# Patient Record
Sex: FEMALE | Race: BLACK OR AFRICAN AMERICAN | URBAN - METROPOLITAN AREA
[De-identification: names, ages, dates, MRNs, and addresses within clinical notes are randomized per-mention and may not be internally consistent; named-entity substitution may affect disease eponyms.]

---

## 2021-02-04 ENCOUNTER — OFFICE VISIT (OUTPATIENT)
Dept: URBAN - METROPOLITAN AREA CLINIC 115 | Facility: CLINIC | Age: 41
End: 2021-02-04

## 2021-02-18 ENCOUNTER — WEB ENCOUNTER (OUTPATIENT)
Dept: URBAN - METROPOLITAN AREA CLINIC 115 | Facility: CLINIC | Age: 41
End: 2021-02-18

## 2021-02-18 ENCOUNTER — LAB OUTSIDE AN ENCOUNTER (OUTPATIENT)
Dept: URBAN - METROPOLITAN AREA CLINIC 115 | Facility: CLINIC | Age: 41
End: 2021-02-18

## 2021-02-18 ENCOUNTER — OFFICE VISIT (OUTPATIENT)
Dept: URBAN - METROPOLITAN AREA CLINIC 115 | Facility: CLINIC | Age: 41
End: 2021-02-18

## 2021-02-18 ENCOUNTER — OFFICE VISIT (OUTPATIENT)
Dept: URBAN - METROPOLITAN AREA CLINIC 115 | Facility: CLINIC | Age: 41
End: 2021-02-18
Payer: MEDICAID

## 2021-02-18 DIAGNOSIS — Z80.0 FAMILY HISTORY OF COLON CANCER: ICD-10-CM

## 2021-02-18 DIAGNOSIS — K64.9 HEMORRHOIDS: ICD-10-CM

## 2021-02-18 DIAGNOSIS — K21.9 GERD (GASTROESOPHAGEAL REFLUX DISEASE): ICD-10-CM

## 2021-02-18 DIAGNOSIS — K57.90 DIVERTICULOSIS: ICD-10-CM

## 2021-02-18 PROBLEM — 14760008 CONSTIPATION: Status: ACTIVE | Noted: 2021-02-18

## 2021-02-18 PROCEDURE — 99204 OFFICE O/P NEW MOD 45 MIN: CPT | Performed by: INTERNAL MEDICINE

## 2021-02-18 PROCEDURE — G9903 PT SCRN TBCO ID AS NON USER: HCPCS | Performed by: INTERNAL MEDICINE

## 2021-02-18 PROCEDURE — G8427 DOCREV CUR MEDS BY ELIG CLIN: HCPCS | Performed by: INTERNAL MEDICINE

## 2021-02-18 PROCEDURE — 1036F TOBACCO NON-USER: CPT | Performed by: INTERNAL MEDICINE

## 2021-02-18 PROCEDURE — G8420 CALC BMI NORM PARAMETERS: HCPCS | Performed by: INTERNAL MEDICINE

## 2021-02-18 PROCEDURE — 99244 OFF/OP CNSLTJ NEW/EST MOD 40: CPT | Performed by: INTERNAL MEDICINE

## 2021-02-18 RX ORDER — SODIUM, POTASSIUM,MAG SULFATES 17.5-3.13G
TAKE 325 ML SOLUTION, RECONSTITUTED, ORAL ORAL ONCE
Qty: 1 | Refills: 0 | OUTPATIENT
Start: 2021-02-18 | End: 2021-02-19

## 2021-02-18 RX ORDER — MESALAMINE 1.2 G/1
1 CAPSULE TABLET, DELAYED RELEASE ORAL ONCE A DAY
Qty: 90 | Refills: 2 | OUTPATIENT
Start: 2021-02-18 | End: 2021-11-15

## 2021-02-18 NOTE — HPI-TODAY'S VISIT:
02/18/2021 Patient is a 41 year old  female who presents on referral from Dr. Suh for evaluation of diverticulosis. She was diagnosed with diverticulosis last summer when she went to the ER in june and July. It was initially suspected to be a stomach virus, but diagnosed as diverticulosis afterwards with CT scan. No previous colonoscopy. Patient also has hemorrhoids which causes her some pain. She thinks they are both internal and external. Denies constipation, blood in the stool, and stomach pain. Her last flare up was about 2 months ago. She has constipation during flare ups, but BM are usually normal. Her grandfather passed away from colon cancer in his 70s. She also has occasional acid reflux, more present when she was pregnant. She also notes feeling a hard feeling in her abdomen from gas.

## 2021-02-19 ENCOUNTER — ERX REFILL RESPONSE (OUTPATIENT)
Dept: URBAN - METROPOLITAN AREA CLINIC 115 | Facility: CLINIC | Age: 41
End: 2021-02-19

## 2021-02-19 RX ORDER — SODIUM SULFATE, POTASSIUM SULFATE, MAGNESIUM SULFATE 17.5; 3.13; 1.6 G/ML; G/ML; G/ML
TAKE AS DIRECTED SOLUTION, CONCENTRATE ORAL
Qty: 354 MILLILITER | Refills: 0 | OUTPATIENT

## 2021-02-19 RX ORDER — SODIUM SULFATE, POTASSIUM SULFATE, MAGNESIUM SULFATE 17.5; 3.13; 1.6 G/ML; G/ML; G/ML
TAKE 325 ML SOLUTION, CONCENTRATE ORAL ONCE
Qty: 354 | Refills: 0 | OUTPATIENT

## 2021-03-12 ENCOUNTER — OFFICE VISIT (OUTPATIENT)
Dept: URBAN - METROPOLITAN AREA SURGERY CENTER 13 | Facility: SURGERY CENTER | Age: 41
End: 2021-03-12
Payer: MEDICAID

## 2021-03-12 DIAGNOSIS — D12.3 ADENOMA OF TRANSVERSE COLON: ICD-10-CM

## 2021-03-12 DIAGNOSIS — Z12.11 COLON CANCER SCREENING: ICD-10-CM

## 2021-03-12 DIAGNOSIS — K64.1 GRADE II HEMORRHOIDS: ICD-10-CM

## 2021-03-12 PROCEDURE — G8907 PT DOC NO EVENTS ON DISCHARG: HCPCS | Performed by: INTERNAL MEDICINE

## 2021-03-12 PROCEDURE — 46221 LIGATION OF HEMORRHOID(S): CPT | Performed by: INTERNAL MEDICINE

## 2021-03-12 PROCEDURE — 45385 COLONOSCOPY W/LESION REMOVAL: CPT | Performed by: INTERNAL MEDICINE

## 2022-08-24 ENCOUNTER — OFFICE VISIT (OUTPATIENT)
Dept: URBAN - METROPOLITAN AREA CLINIC 82 | Facility: CLINIC | Age: 42
End: 2022-08-24

## 2022-10-14 ENCOUNTER — DASHBOARD ENCOUNTERS (OUTPATIENT)
Age: 42
End: 2022-10-14

## 2022-10-14 ENCOUNTER — OFFICE VISIT (OUTPATIENT)
Dept: URBAN - METROPOLITAN AREA CLINIC 82 | Facility: CLINIC | Age: 42
End: 2022-10-14
Payer: MEDICAID

## 2022-10-14 VITALS
RESPIRATION RATE: 16 BRPM | DIASTOLIC BLOOD PRESSURE: 95 MMHG | HEIGHT: 65 IN | BODY MASS INDEX: 28.16 KG/M2 | TEMPERATURE: 98.3 F | WEIGHT: 169 LBS | HEART RATE: 83 BPM | SYSTOLIC BLOOD PRESSURE: 148 MMHG

## 2022-10-14 DIAGNOSIS — K64.8 BLEEDING INTERNAL HEMORRHOIDS: ICD-10-CM

## 2022-10-14 DIAGNOSIS — Z80.0 FAMILY HISTORY OF COLON CANCER: ICD-10-CM

## 2022-10-14 DIAGNOSIS — K21.9 GERD (GASTROESOPHAGEAL REFLUX DISEASE): ICD-10-CM

## 2022-10-14 DIAGNOSIS — K57.30 ACQUIRED DIVERTICULOSIS OF COLON: ICD-10-CM

## 2022-10-14 PROBLEM — 312824007 FAMILY HISTORY OF CANCER OF COLON (SITUATION): Status: ACTIVE | Noted: 2021-02-18

## 2022-10-14 PROBLEM — 398050005 DIVERTICULAR DISEASE OF COLON: Status: ACTIVE | Noted: 2021-02-18

## 2022-10-14 PROBLEM — 70153002 HEMORRHOIDS: Status: ACTIVE | Noted: 2021-02-18

## 2022-10-14 PROBLEM — 235595009 GASTROESOPHAGEAL REFLUX DISEASE: Status: ACTIVE | Noted: 2021-02-18

## 2022-10-14 PROCEDURE — 46600 DIAGNOSTIC ANOSCOPY SPX: CPT | Performed by: INTERNAL MEDICINE

## 2022-10-14 PROCEDURE — 99214 OFFICE O/P EST MOD 30 MIN: CPT | Performed by: INTERNAL MEDICINE

## 2022-10-14 PROCEDURE — G9903 PT SCRN TBCO ID AS NON USER: HCPCS | Performed by: INTERNAL MEDICINE

## 2022-10-14 PROCEDURE — 1036F TOBACCO NON-USER: CPT | Performed by: INTERNAL MEDICINE

## 2022-10-14 PROCEDURE — G8420 CALC BMI NORM PARAMETERS: HCPCS | Performed by: INTERNAL MEDICINE

## 2022-10-14 PROCEDURE — G8427 DOCREV CUR MEDS BY ELIG CLIN: HCPCS | Performed by: INTERNAL MEDICINE

## 2022-10-14 PROCEDURE — 46611 ANOSCOPY: CPT | Performed by: INTERNAL MEDICINE

## 2022-10-14 RX ORDER — HYDROCORTISONE 2.5% 25 MG/G
1 APPLICATION CREAM TOPICAL TWICE A DAY
Qty: 60 GRAMS | Refills: 1 | OUTPATIENT
Start: 2022-10-14 | End: 2022-11-10

## 2022-10-14 RX ORDER — LEVOCETIRIZINE DIHYDROCHLORIDE 5 MG/1
1 TABLET IN THE EVENING TABLET, FILM COATED ORAL ONCE A DAY
Status: ON HOLD | COMMUNITY

## 2022-10-14 RX ORDER — TRIAMCINOLONE ACETONIDE 1 MG/G
1 APPLICATION CREAM TOPICAL
Status: ON HOLD | COMMUNITY

## 2022-10-14 RX ORDER — SODIUM SULFATE, POTASSIUM SULFATE, MAGNESIUM SULFATE 17.5; 3.13; 1.6 G/ML; G/ML; G/ML
TAKE AS DIRECTED SOLUTION, CONCENTRATE ORAL
Qty: 354 MILLILITER | Refills: 0 | COMMUNITY

## 2022-10-14 NOTE — HPI-TODAY'S VISIT:
02/18/2021 Patient is a 41 year old  female who presents on referral from Dr. Suh for evaluation of diverticulosis. She was diagnosed with diverticulosis last summer when she went to the ER in june and July. It was initially suspected to be a stomach virus, but diagnosed as diverticulosis afterwards with CT scan. No previous colonoscopy. Patient also has hemorrhoids which causes her some pain. She thinks they are both internal and external. Denies constipation, blood in the stool, and stomach pain. Her last flare up was about 2 months ago. She has constipation during flare ups, but BM are usually normal. Her grandfather passed away from colon cancer in his 70s. She also has occasional acid reflux, more present when she was pregnant. She also notes feeling a hard feeling in her abdomen from gas.  10/14/2022 Patient presents for hemorrhoids. Patient states that her hemorrhoids are always swollen and that she thinks they are outside and inside. She denies constipation and states that she sometimes has rectal bleeding due to her wiping hard.

## 2024-06-27 ENCOUNTER — LAB OUTSIDE AN ENCOUNTER (OUTPATIENT)
Dept: URBAN - METROPOLITAN AREA CLINIC 115 | Facility: CLINIC | Age: 44
End: 2024-06-27

## 2024-06-27 ENCOUNTER — OFFICE VISIT (OUTPATIENT)
Dept: URBAN - METROPOLITAN AREA CLINIC 115 | Facility: CLINIC | Age: 44
End: 2024-06-27
Payer: MEDICAID

## 2024-06-27 VITALS
WEIGHT: 174.6 LBS | DIASTOLIC BLOOD PRESSURE: 83 MMHG | HEART RATE: 81 BPM | SYSTOLIC BLOOD PRESSURE: 123 MMHG | BODY MASS INDEX: 29.09 KG/M2 | TEMPERATURE: 97.8 F | HEIGHT: 65 IN

## 2024-06-27 DIAGNOSIS — K80.20 GALLSTONES: ICD-10-CM

## 2024-06-27 DIAGNOSIS — K21.9 GASTROESOPHAGEAL REFLUX DISEASE, UNSPECIFIED WHETHER ESOPHAGITIS PRESENT: ICD-10-CM

## 2024-06-27 DIAGNOSIS — K64.8 OTHER HEMORRHOIDS: ICD-10-CM

## 2024-06-27 DIAGNOSIS — K59.09 OTHER CONSTIPATION: ICD-10-CM

## 2024-06-27 DIAGNOSIS — K76.9 HEPATIC LESION: ICD-10-CM

## 2024-06-27 PROBLEM — 235919008: Status: ACTIVE | Noted: 2024-06-27

## 2024-06-27 PROBLEM — 235595009: Status: ACTIVE | Noted: 2024-06-27

## 2024-06-27 PROBLEM — 70153002: Status: ACTIVE | Noted: 2024-06-27

## 2024-06-27 PROBLEM — 14760008: Status: ACTIVE | Noted: 2024-06-27

## 2024-06-27 PROBLEM — 300331000: Status: ACTIVE | Noted: 2024-06-27

## 2024-06-27 PROCEDURE — 99214 OFFICE O/P EST MOD 30 MIN: CPT

## 2024-06-27 RX ORDER — AMLODIPINE BESYLATE 5 MG/1
1 TABLET TABLET ORAL ONCE A DAY
Status: ACTIVE | COMMUNITY

## 2024-06-27 RX ORDER — LEVOCETIRIZINE DIHYDROCHLORIDE 5 MG/1
1 TABLET IN THE EVENING TABLET, FILM COATED ORAL ONCE A DAY
Status: ON HOLD | COMMUNITY

## 2024-06-27 RX ORDER — TRIAMCINOLONE ACETONIDE 1 MG/G
1 APPLICATION CREAM TOPICAL
Status: ON HOLD | COMMUNITY

## 2024-06-27 RX ORDER — PANTOPRAZOLE SODIUM 40 MG/1
1 TABLET BEFORE BREAKFAST TABLET, DELAYED RELEASE ORAL ONCE A DAY
Qty: 30 TABLET | Refills: 1 | OUTPATIENT
Start: 2024-06-27

## 2024-06-27 RX ORDER — SODIUM SULFATE, POTASSIUM SULFATE, MAGNESIUM SULFATE 17.5; 3.13; 1.6 G/ML; G/ML; G/ML
TAKE AS DIRECTED SOLUTION, CONCENTRATE ORAL
Qty: 354 MILLILITER | Refills: 0 | COMMUNITY

## 2024-06-27 NOTE — PHYSICAL EXAM GASTROINTESTINAL
Abdomen , soft, mild generalized tenderness, neg Solomon's sign, nondistended , no guarding or rigidity , no masses palpable

## 2024-06-27 NOTE — HPI-TODAY'S VISIT:
45 y/o F with PMH of HTN presents with c/o gallstones and liver lesion. Went to [place] on 6/4/24 for L sided abdominal pain. Had CT that showed a 24 x 21 mm peripherally enhancing low attenuation lesion (poss hemangioma) on R lobe (rec 3 phase CT  for further characterization, gallstones. AST 28, ALT 21, alkP 87, tot bili 0.6, plts 171. FIB4 1.57 (further investigation needed). Discharged with famotidine 20mg and naproxen 500mg. States the famotidine has helped a little. Today she denies the L sided abdominal pain. States she has postprandial abdominal pain that leads to a BM; having BMs at least 3x a day, sometimes diarrhea. Reports hemorrhoid sx still, bleeding with wiping and swelling/pains. Also reports heartburn that she does not take anything for it; just drinks something with sean. Notes she does get nausea more recently but unsure if it is d/t her menstrual period. BMs are usually type 3-4, sometimes in between typ 6-7; incomplete evacuation. Denies abdominal pain, vomting, constipation, dysphagia, odynophagia, unintentional weight loss, change in appetite, early satiety. Occ EtOH (glass of wine socially). Denies tobacco/marijuana use.  Last colonoscopy: 2021 with Dr. Cruz showed hemorrhoids, one 10mm TA in transverse colon, hemorrhoids s/p banding x3, diverticula in sigmoid/descending colon; repeat in 5Y.

## 2024-07-23 ENCOUNTER — TELEPHONE ENCOUNTER (OUTPATIENT)
Dept: URBAN - METROPOLITAN AREA CLINIC 115 | Facility: CLINIC | Age: 44
End: 2024-07-23

## 2024-07-23 ENCOUNTER — TELEPHONE ENCOUNTER (OUTPATIENT)
Dept: URBAN - METROPOLITAN AREA CLINIC 54 | Facility: CLINIC | Age: 44
End: 2024-07-23

## 2024-07-23 ENCOUNTER — LAB OUTSIDE AN ENCOUNTER (OUTPATIENT)
Dept: URBAN - METROPOLITAN AREA CLINIC 115 | Facility: CLINIC | Age: 44
End: 2024-07-23

## 2024-07-25 PROBLEM — 31258000: Status: ACTIVE | Noted: 2024-07-25

## 2024-07-31 LAB — CA 19-9: <3

## 2024-08-12 ENCOUNTER — LAB OUTSIDE AN ENCOUNTER (OUTPATIENT)
Dept: URBAN - METROPOLITAN AREA CLINIC 115 | Facility: CLINIC | Age: 44
End: 2024-08-12

## 2024-08-12 ENCOUNTER — OFFICE VISIT (OUTPATIENT)
Dept: URBAN - METROPOLITAN AREA CLINIC 115 | Facility: CLINIC | Age: 44
End: 2024-08-12
Payer: MEDICAID

## 2024-08-12 VITALS
SYSTOLIC BLOOD PRESSURE: 127 MMHG | HEIGHT: 65 IN | BODY MASS INDEX: 28.86 KG/M2 | DIASTOLIC BLOOD PRESSURE: 81 MMHG | TEMPERATURE: 96.5 F | RESPIRATION RATE: 15 BRPM | WEIGHT: 173.2 LBS | HEART RATE: 99 BPM

## 2024-08-12 DIAGNOSIS — K76.0 FATTY LIVER DISEASE, NONALCOHOLIC: ICD-10-CM

## 2024-08-12 DIAGNOSIS — K21.9 GASTRO-ESOPHAGEAL REFLUX DISEASE WITHOUT ESOPHAGITIS: ICD-10-CM

## 2024-08-12 DIAGNOSIS — K86.2 PANCREATIC CYST: ICD-10-CM

## 2024-08-12 PROBLEM — 1231824009: Status: ACTIVE | Noted: 2024-08-12

## 2024-08-12 PROCEDURE — 99214 OFFICE O/P EST MOD 30 MIN: CPT

## 2024-08-12 RX ORDER — LEVOCETIRIZINE DIHYDROCHLORIDE 5 MG/1
1 TABLET IN THE EVENING TABLET, FILM COATED ORAL ONCE A DAY
Status: ON HOLD | COMMUNITY

## 2024-08-12 RX ORDER — AMLODIPINE BESYLATE 5 MG/1
1 TABLET TABLET ORAL ONCE A DAY
Status: ACTIVE | COMMUNITY

## 2024-08-12 RX ORDER — SODIUM SULFATE, POTASSIUM SULFATE, MAGNESIUM SULFATE 17.5; 3.13; 1.6 G/ML; G/ML; G/ML
TAKE AS DIRECTED SOLUTION, CONCENTRATE ORAL
Qty: 354 MILLILITER | Refills: 0 | COMMUNITY

## 2024-08-12 RX ORDER — PANTOPRAZOLE SODIUM 40 MG/1
1 TABLET BEFORE BREAKFAST TABLET, DELAYED RELEASE ORAL ONCE A DAY
Qty: 30 TABLET | Refills: 1
Start: 2024-06-27

## 2024-08-12 RX ORDER — TRIAMCINOLONE ACETONIDE 1 MG/G
1 APPLICATION CREAM TOPICAL
Status: ON HOLD | COMMUNITY

## 2024-08-12 RX ORDER — PANTOPRAZOLE SODIUM 40 MG/1
1 TABLET BEFORE BREAKFAST TABLET, DELAYED RELEASE ORAL ONCE A DAY
Qty: 30 TABLET | Refills: 1 | Status: ACTIVE | COMMUNITY
Start: 2024-06-27

## 2024-08-12 NOTE — PHYSICAL EXAM GASTROINTESTINAL
Abdomen , soft, epigastric mild tenderness, nondistended , no guarding or rigidity , no masses palpable

## 2024-08-12 NOTE — HPI-TODAY'S VISIT:
43 y/o F with PMH of HTN presents with c/o gallstones and liver lesion. 6/27/24: Went to [place] on 6/4/24 for L sided abdominal pain. Had CT that showed a 24 x 21 mm peripherally enhancing low attenuation lesion (poss hemangioma) on R lobe (rec 3 phase CT  for further characterization, gallstones. AST 28, ALT 21, alkP 87, tot bili 0.6, plts 171. FIB4 1.57 (further investigation needed). Discharged with famotidine 20mg and naproxen 500mg. States the famotidine has helped a little. Today she denies the L sided abdominal pain. States she has postprandial abdominal pain that leads to a BM; having BMs at least 3x a day, sometimes diarrhea. Reports hemorrhoid sx still, bleeding with wiping and swelling/pains. Also reports heartburn that she does not take anything for it; just drinks something with sean. Notes she does get nausea more recently but unsure if it is d/t her menstrual period. BMs are usually type 3-4, sometimes in between typ 6-7; incomplete evacuation. Denies abdominal pain, vomting, constipation, dysphagia, odynophagia, unintentional weight loss, change in appetite, early satiety. Occ EtOH (glass of wine socially). Denies tobacco/marijuana use.  Last colonoscopy: 2021 with Dr. Cruz showed hemorrhoids, one 10mm TA in transverse colon, hemorrhoids s/p banding x3, diverticula in sigmoid/descending colon; repeat in 5Y. 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 Subsequent CA 19-9 was <3. Denies abdominal pain, n/v, EtOH use. States she has been taking pantoprazole 40mg which has been helping. Heartburn, postprandial abdominal pain and diarrhea are infrequent. Notes that when she eats leafy greens she gets trigger. States she has been avoiding greasy and spicy foods.

## 2024-08-23 ENCOUNTER — TELEPHONE ENCOUNTER (OUTPATIENT)
Dept: URBAN - METROPOLITAN AREA CLINIC 115 | Facility: CLINIC | Age: 44
End: 2024-08-23

## 2024-08-29 ENCOUNTER — OFFICE VISIT (OUTPATIENT)
Dept: URBAN - METROPOLITAN AREA CLINIC 115 | Facility: CLINIC | Age: 44
End: 2024-08-29

## 2024-10-14 ENCOUNTER — OFFICE VISIT (OUTPATIENT)
Dept: URBAN - METROPOLITAN AREA CLINIC 115 | Facility: CLINIC | Age: 44
End: 2024-10-14

## 2024-11-04 ENCOUNTER — ERX REFILL RESPONSE (OUTPATIENT)
Dept: URBAN - METROPOLITAN AREA CLINIC 115 | Facility: CLINIC | Age: 44
End: 2024-11-04

## 2024-11-04 RX ORDER — PANTOPRAZOLE SODIUM 40 MG/1
1 TABLET BEFORE BREAKFAST TABLET, DELAYED RELEASE ORAL ONCE A DAY
Qty: 30 TABLET | Refills: 1 | OUTPATIENT

## 2024-11-04 RX ORDER — PANTOPRAZOLE SODIUM 40 MG/1
TAKE 1 TABLET BY MOUTH EVERY DAY BEFORE BREAKFAST TABLET, DELAYED RELEASE ORAL
Qty: 30 TABLET | Refills: 1 | OUTPATIENT

## 2024-11-26 ENCOUNTER — OFFICE VISIT (OUTPATIENT)
Dept: URBAN - METROPOLITAN AREA MEDICAL CENTER 24 | Facility: MEDICAL CENTER | Age: 44
End: 2024-11-26

## 2025-02-20 ENCOUNTER — OFFICE VISIT (OUTPATIENT)
Dept: URBAN - METROPOLITAN AREA CLINIC 115 | Facility: CLINIC | Age: 45
End: 2025-02-20

## 2025-02-26 ENCOUNTER — OFFICE VISIT (OUTPATIENT)
Dept: URBAN - METROPOLITAN AREA CLINIC 115 | Facility: CLINIC | Age: 45
End: 2025-02-26

## 2025-03-06 ENCOUNTER — OFFICE VISIT (OUTPATIENT)
Dept: URBAN - METROPOLITAN AREA CLINIC 115 | Facility: CLINIC | Age: 45
End: 2025-03-06
Payer: COMMERCIAL

## 2025-03-06 ENCOUNTER — LAB OUTSIDE AN ENCOUNTER (OUTPATIENT)
Dept: URBAN - METROPOLITAN AREA CLINIC 115 | Facility: CLINIC | Age: 45
End: 2025-03-06

## 2025-03-06 VITALS
SYSTOLIC BLOOD PRESSURE: 127 MMHG | WEIGHT: 176 LBS | HEART RATE: 101 BPM | DIASTOLIC BLOOD PRESSURE: 84 MMHG | TEMPERATURE: 98.5 F | HEIGHT: 65 IN | BODY MASS INDEX: 29.32 KG/M2

## 2025-03-06 DIAGNOSIS — R10.84 GENERALIZED ABDOMINAL PAIN: ICD-10-CM

## 2025-03-06 DIAGNOSIS — K64.9 HEMORRHOIDS: ICD-10-CM

## 2025-03-06 DIAGNOSIS — Z80.0 FAMILY HISTORY OF COLON CANCER: ICD-10-CM

## 2025-03-06 DIAGNOSIS — K86.2 PANCREATIC CYST: ICD-10-CM

## 2025-03-06 DIAGNOSIS — K57.90 DIVERTICULOSIS: ICD-10-CM

## 2025-03-06 DIAGNOSIS — K21.9 GERD (GASTROESOPHAGEAL REFLUX DISEASE): ICD-10-CM

## 2025-03-06 PROCEDURE — 99214 OFFICE O/P EST MOD 30 MIN: CPT | Performed by: INTERNAL MEDICINE

## 2025-03-06 RX ORDER — PANTOPRAZOLE SODIUM 40 MG/1
TAKE 1 TABLET BY MOUTH EVERY DAY BEFORE BREAKFAST TABLET, DELAYED RELEASE ORAL
Qty: 30 TABLET | Refills: 1 | Status: ACTIVE | COMMUNITY

## 2025-03-06 RX ORDER — AMLODIPINE BESYLATE 5 MG/1
1 TABLET TABLET ORAL ONCE A DAY
Status: ACTIVE | COMMUNITY

## 2025-03-06 RX ORDER — TRIAMCINOLONE ACETONIDE 1 MG/G
1 APPLICATION CREAM TOPICAL
Status: ON HOLD | COMMUNITY

## 2025-03-06 RX ORDER — LEVOCETIRIZINE DIHYDROCHLORIDE 5 MG/1
1 TABLET IN THE EVENING TABLET, FILM COATED ORAL ONCE A DAY
Status: ON HOLD | COMMUNITY

## 2025-03-06 NOTE — HPI-TODAY'S VISIT:
02/18/2021 Patient is a 41 year old  female who presents on referral from Dr. Suh for evaluation of diverticulosis. She was diagnosed with diverticulosis last summer when she went to the ER in june and July. It was initially suspected to be a stomach virus, but diagnosed as diverticulosis afterwards with CT scan. No previous colonoscopy. Patient also has hemorrhoids which causes her some pain. She thinks they are both internal and external. Denies constipation, blood in the stool, and stomach pain. Her last flare up was about 2 months ago. She has constipation during flare ups, but BM are usually normal. Her grandfather passed away from colon cancer in his 70s. She also has occasional acid reflux, more present when she was pregnant. She also notes feeling a hard feeling in her abdomen from gas.  10/14/2022 Patient presents for hemorrhoids. Patient states that her hemorrhoids are always swollen and that she thinks they are outside and inside. She denies constipation and states that she sometimes has rectal bleeding due to her wiping hard.  3/6/2025 Patient presents for follow up appointment. She says that she has been having hemorrhoids, and has not seen Dr. Gillette. Patient says the hemorrhoids bleed occasioanlly and says they constantly give her pain. Denies constipation. She mentions she has abdominal pain and says when the hemorrhoids get swollen she especially has abdominal pain.    Will refer to Dr. Gillette

## 2025-03-13 ENCOUNTER — OFFICE VISIT (OUTPATIENT)
Dept: URBAN - METROPOLITAN AREA CLINIC 115 | Facility: CLINIC | Age: 45
End: 2025-03-13

## 2025-04-21 ENCOUNTER — TELEPHONE ENCOUNTER (OUTPATIENT)
Dept: URBAN - METROPOLITAN AREA CLINIC 115 | Facility: CLINIC | Age: 45
End: 2025-04-21

## 2025-04-22 ENCOUNTER — OFFICE VISIT (OUTPATIENT)
Dept: URBAN - METROPOLITAN AREA MEDICAL CENTER 24 | Facility: MEDICAL CENTER | Age: 45
End: 2025-04-22

## 2025-04-22 RX ORDER — AMLODIPINE BESYLATE 5 MG/1
1 TABLET TABLET ORAL ONCE A DAY
Status: ACTIVE | COMMUNITY

## 2025-04-22 RX ORDER — LEVOCETIRIZINE DIHYDROCHLORIDE 5 MG/1
1 TABLET IN THE EVENING TABLET, FILM COATED ORAL ONCE A DAY
Status: ON HOLD | COMMUNITY

## 2025-04-22 RX ORDER — PANTOPRAZOLE SODIUM 40 MG/1
TAKE 1 TABLET BY MOUTH EVERY DAY BEFORE BREAKFAST TABLET, DELAYED RELEASE ORAL
Qty: 30 TABLET | Refills: 1 | Status: ACTIVE | COMMUNITY

## 2025-04-22 RX ORDER — TRIAMCINOLONE ACETONIDE 1 MG/G
1 APPLICATION CREAM TOPICAL
Status: ON HOLD | COMMUNITY